# Patient Record
Sex: MALE | Race: WHITE | Employment: UNEMPLOYED | URBAN - METROPOLITAN AREA
[De-identification: names, ages, dates, MRNs, and addresses within clinical notes are randomized per-mention and may not be internally consistent; named-entity substitution may affect disease eponyms.]

---
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
## 2021-01-01 ENCOUNTER — HOSPITAL ENCOUNTER (EMERGENCY)
Age: 21
Discharge: HOME | End: 2021-01-01
Attending: EMERGENCY MEDICINE
Payer: COMMERCIAL

## 2021-01-01 VITALS
WEIGHT: 315 LBS | TEMPERATURE: 97.9 F | HEIGHT: 67 IN | RESPIRATION RATE: 20 BRPM | BODY MASS INDEX: 49.44 KG/M2 | DIASTOLIC BLOOD PRESSURE: 85 MMHG | SYSTOLIC BLOOD PRESSURE: 143 MMHG | HEART RATE: 106 BPM | OXYGEN SATURATION: 96 %

## 2021-01-01 DIAGNOSIS — J02.0 STREP PHARYNGITIS: Primary | ICD-10-CM

## 2021-01-01 LAB — S PYO AG THROAT QL: POSITIVE

## 2021-01-01 PROCEDURE — 87880 STREP A ASSAY W/OPTIC: CPT

## 2021-01-01 PROCEDURE — 6370000000 HC RX 637 (ALT 250 FOR IP): Performed by: EMERGENCY MEDICINE

## 2021-01-01 PROCEDURE — 99283 EMERGENCY DEPT VISIT LOW MDM: CPT

## 2021-01-01 PROCEDURE — 96374 THER/PROPH/DIAG INJ IV PUSH: CPT

## 2021-01-01 PROCEDURE — 6360000002 HC RX W HCPCS: Performed by: EMERGENCY MEDICINE

## 2021-01-01 RX ORDER — AMOXICILLIN 500 MG/1
500 CAPSULE ORAL 2 TIMES DAILY
Qty: 20 CAPSULE | Refills: 0 | Status: SHIPPED | OUTPATIENT
Start: 2021-01-01 | End: 2021-01-11

## 2021-01-01 RX ORDER — DEXAMETHASONE SODIUM PHOSPHATE 10 MG/ML
8 INJECTION, SOLUTION INTRAMUSCULAR; INTRAVENOUS ONCE
Status: COMPLETED | OUTPATIENT
Start: 2021-01-01 | End: 2021-01-01

## 2021-01-01 RX ORDER — AMOXICILLIN 500 MG/1
500 CAPSULE ORAL ONCE
Status: COMPLETED | OUTPATIENT
Start: 2021-01-01 | End: 2021-01-01

## 2021-01-01 RX ADMIN — AMOXICILLIN 500 MG: 500 CAPSULE ORAL at 17:02

## 2021-01-01 RX ADMIN — DEXAMETHASONE SODIUM PHOSPHATE 8 MG: 10 INJECTION, SOLUTION INTRAMUSCULAR; INTRAVENOUS at 17:00

## 2021-01-01 ASSESSMENT — ENCOUNTER SYMPTOMS
CONSTIPATION: 0
SHORTNESS OF BREATH: 0
TROUBLE SWALLOWING: 1
DIARRHEA: 0
SORE THROAT: 1
NAUSEA: 0
COUGH: 0
ABDOMINAL PAIN: 0
BACK PAIN: 0
VOMITING: 0

## 2021-01-01 ASSESSMENT — PAIN DESCRIPTION - PAIN TYPE: TYPE: ACUTE PAIN

## 2021-01-01 NOTE — ED PROVIDER NOTES
[address] [facility] Name: Joaquin Mortimer  MRN: [id number]  [place] 2000  Date of evaluation: 1/1/2021  Provider: Jarrod Payne MD    [address] [place] [address]       Chief Complaint   Patient presents with    Pharyngitis     reports sore throat x 2 weeks. no tylenol ormotrin pta. denies any cough, runny nose or fever or chills. pain worse on right side of throat. resp easy and unlabored. reports came to ed because is unable to talk today. HISTORY OF PRESENT ILLNESS   (Location/Symptom, Timing/Onset, Context/Setting, Quality, Duration, Modifying Factors, Severity)  Note limiting factors. Joaquin Mortimer is a 24 y.o. male who presents to the emergency department     Patient is a 55-year-old male with a past medical history of ADHD who presents with sore throat and difficulty swallowing. Patient reports symptoms started 2 weeks ago. He states that he thought that the symptoms would just go away but when they persisted he decided to come in for evaluation. He reports pain with swallowing food and even liquids but is able to do so. He denies any fevers or chills. Does report fatigue. States that he feels as though his voice is muffled and sounds different. Denies any difficulty breathing or chest pain. Denies any nausea, vomiting, diarrhea. Patient has had strep throat in the past.    The history is provided by the patient. Nursing Notes were reviewed. REVIEW OF SYSTEMS    (2-9 systems for level 4, 10 or more for level 5)     Review of Systems   Constitutional: Positive for fatigue. Negative for chills and fever. HENT: Positive for sore throat and trouble swallowing. Negative for congestion. Eyes: Negative for visual disturbance. Respiratory: Negative for cough and shortness of breath. Cardiovascular: Negative for chest pain. Gastrointestinal: Negative for abdominal pain, constipation, diarrhea, nausea and vomiting. Genitourinary: Negative for dysuria and hematuria. Musculoskeletal: Negative for back pain and neck pain. Skin: Negative for pallor and rash. Neurological: Negative for light-headedness and headaches. All other systems reviewed and are negative. Except as noted above the remainder of the review of systems was reviewed and negative. PAST MEDICAL HISTORY     Past Medical History:   Diagnosis Date    ADHD (attention deficit hyperactivity disorder)     MRSA (methicillin resistant staph aureus) culture positive 1/10/13    finger    Seasonal allergies          SURGICAL HISTORY     History reviewed. No pertinent surgical history. CURRENT MEDICATIONS       Previous Medications    No medications on file       ALLERGIES     Patient has no known allergies. FAMILY HISTORY     History reviewed. No pertinent family history.        SOCIAL HISTORY       Social History     Socioeconomic History    Marital status: Single     Spouse name: None    Number of children: None    Years of education: None    Highest education level: None   Occupational History    None   Social Needs    Financial resource strain: None    Food insecurity     Worry: None     Inability: None    Transportation needs     Medical: None     Non-medical: None   Tobacco Use    Smoking status: Never Smoker    Smokeless tobacco: Current User     Types: Snuff   Substance and Sexual Activity    Alcohol use: No    Drug use: No    Sexual activity: Never   Lifestyle    Physical activity     Days per week: None     Minutes per session: None    Stress: None   Relationships    Social connections     Talks on phone: None     Gets together: None     Attends Yarsanism service: None     Active member of club or organization: None     Attends meetings of clubs or organizations: None     Relationship status: None    Intimate partner violence     Fear of current or ex partner: None     Emotionally abused: None     Physically abused: None Forced sexual activity: None   Other Topics Concern    None   Social History Narrative    None       SCREENINGS               PHYSICAL EXAM    (up to 7 for level 4, 8 or more for level 5)     ED Triage Vitals   BP Temp Temp src Pulse Resp SpO2 Height Weight   -- -- -- -- -- -- -- --       Physical Exam  Vitals signs and nursing note reviewed. Constitutional:       General: He is not in acute distress. Appearance: Normal appearance. HENT:      Head: Normocephalic and atraumatic. Nose: Nose normal. No congestion. Mouth/Throat:      Mouth: Mucous membranes are moist.      Pharynx: Uvula midline. Pharyngeal swelling and posterior oropharyngeal erythema present. Tonsils: Tonsillar exudate present. No tonsillar abscesses. Comments: Patient's tonsils are significantly swollen with right slightly greater than left. Uvula is still midline. Does not appear to be any evidence of a peritonsillar abscess. Patient is able to open mouth and I am able to clearly see the posterior oropharynx. Eyes:      Conjunctiva/sclera: Conjunctivae normal.   Neck:      Musculoskeletal: Normal range of motion and neck supple. Cardiovascular:      Rate and Rhythm: Normal rate and regular rhythm. Pulses: Normal pulses. Heart sounds: Normal heart sounds. No murmur. Pulmonary:      Effort: Pulmonary effort is normal. No respiratory distress. Breath sounds: Normal breath sounds. Abdominal:      General: There is no distension. Palpations: Abdomen is soft. Tenderness: There is no abdominal tenderness. Musculoskeletal: Normal range of motion. General: No swelling or deformity. Lymphadenopathy:      Cervical: Cervical adenopathy present. Skin:     General: Skin is warm and dry. Neurological:      General: No focal deficit present. Mental Status: He is alert and oriented to person, place, and time.          DIAGNOSTIC RESULTS     EKG: All EKG's are interpreted by the Emergency Department Physician who either signs or Co-signs this chart in the absence of a cardiologist.        RADIOLOGY:     Interpretation per the Radiologist below, if available at the time of this note:    No orders to display         LABS:  Labs Reviewed   Tristan - Abnormal; Notable for the following components:       Result Value    Rapid Strep A Screen POSITIVE (*)     All other components within normal limits    Narrative:     Performed at:  [facility]  [address] [facility],  [place] [address] [place], [address] [place] [address]   Phone [phone]       All other labs were within normal range or not returned as of this dictation. EMERGENCY DEPARTMENT COURSE and DIFFERENTIAL DIAGNOSIS/MDM:   Vitals:    Vitals:    01/01/21 1525   BP: (!) 169/94   Pulse: 89   Resp: 24   Temp: 97.9 °F (36.6 °C)   SpO2: 98%   Weight: (!) 380 lb (172.4 kg)   Height: 5' 7\" (1.702 m)       Patient evaluated and previous record reviewed. Patient presents with sore throat for the last 2 weeks. Vital signs notable for high blood pressure. Physical exam as documented above. Rapid strep is positive. Patient does not appear to have a peritonsillar abscess but rather just very swollen tonsils. Will treat patient with a dose of IM Decadron here in the emergency department and give him first dose of antibiotic. Will discharge patient home on course of amoxicillin. I did  him on at home care instructions as well as return precautions and he voices understanding. CONSULTS:  None    PROCEDURES:  Unless otherwise noted below, none     Procedures      FINAL IMPRESSION      1.  Strep pharyngitis          DISPOSITION/PLAN   DISPOSITION Decision To Discharge 01/01/2021 04:14:04 PM      PATIENT REFERRED TO:  Kika Sidhu    Schedule an appointment as soon as possible for a visit         DISCHARGE MEDICATIONS:  New Prescriptions    AMOXICILLIN (AMOXIL) 500 MG CAPSULE    Take 1 capsule by

## 2021-05-20 ENCOUNTER — APPOINTMENT (OUTPATIENT)
Dept: GENERAL RADIOLOGY | Age: 21
End: 2021-05-20
Payer: COMMERCIAL

## 2021-05-20 ENCOUNTER — HOSPITAL ENCOUNTER (EMERGENCY)
Age: 21
Discharge: LEFT BEFORE DISCHARGE | End: 2021-05-20
Payer: COMMERCIAL

## 2021-05-20 VITALS
HEIGHT: 71 IN | OXYGEN SATURATION: 95 % | WEIGHT: 315 LBS | DIASTOLIC BLOOD PRESSURE: 92 MMHG | SYSTOLIC BLOOD PRESSURE: 181 MMHG | HEART RATE: 105 BPM | TEMPERATURE: 97.2 F | BODY MASS INDEX: 44.1 KG/M2 | RESPIRATION RATE: 16 BRPM

## 2021-05-20 DIAGNOSIS — J30.2 SEASONAL ALLERGIES: ICD-10-CM

## 2021-05-20 DIAGNOSIS — R05.9 COUGH: Primary | ICD-10-CM

## 2021-05-20 PROCEDURE — 99281 EMR DPT VST MAYX REQ PHY/QHP: CPT

## 2021-05-20 PROCEDURE — 99282 EMERGENCY DEPT VISIT SF MDM: CPT

## 2021-05-20 PROCEDURE — 71046 X-RAY EXAM CHEST 2 VIEWS: CPT

## 2021-05-20 RX ORDER — FLUTICASONE PROPIONATE 50 MCG
1 SPRAY, SUSPENSION (ML) NASAL DAILY
Status: DISCONTINUED | OUTPATIENT
Start: 2021-05-20 | End: 2021-05-20 | Stop reason: HOSPADM

## 2021-05-20 RX ORDER — CETIRIZINE HYDROCHLORIDE, PSEUDOEPHEDRINE HYDROCHLORIDE 5; 120 MG/1; MG/1
1 TABLET, FILM COATED, EXTENDED RELEASE ORAL 2 TIMES DAILY
Status: DISCONTINUED | OUTPATIENT
Start: 2021-05-20 | End: 2021-05-20 | Stop reason: HOSPADM

## 2021-05-20 NOTE — ED PROVIDER NOTES
[address]  EMERGENCY DEPARTMENT ENCOUNTER        Pt Name: Laci Stein  MRN: [id number]  [place] 2000  Date of evaluation: 5/20/2021  Provider: JOSEF Villagomez  PCP: No primary care provider on file. This patient was not seen and evaluated by the attending physician No att. providers found. I have evaluated this patient. My supervising physician was available for consultation. CHIEF COMPLAINT       Chief Complaint   Patient presents with    Cough     pt states congestion cough and sore throat for a few days, pt states he gets like this every summer but wants checked. HISTORY OF PRESENT ILLNESS   (Location/Symptom, Timing/Onset, Context/Setting, Quality, Duration, Modifying Factors, Severity)  Note limiting factors. Laci Stein is a 24 y.o. male who presents via private vehicle from his home for cough. Pt notes he has had runny nose, itchy watery eyes, mild nonproductive cough and nasal congestion for the past several days, not getting better but not getting worse. He notes he typically feels seasonal allergies and this feels siumilar. He has not taken any medicine for his symptoms. He presents today wanting to be sure nothing else was going on. He denies chest pain, SOB, lightheadedness or faintness. He denies nausea, abdominal pain, sore throat, fevers or headaches. He denies recent sick contacts. He denies pmhx covid infection or vaccination. Nursing Notes were all reviewed and agreed with or any disagreements were addressed  in the HPI. Pt was seen during the [place] 19 pandemic. Appropriate PPE worn by ME during patient encounters. Pt seen during a time with constrained hospital bed capacity and other potential inpatient and outpatient resources were constrained due to the viral pandemic. REVIEW OF SYSTEMS    (2-9 systems for level 4, 10 or more for level 5)     Review of Systems    Positives and Pertinent negatives as per HPI.   Except as noted abovein the ROS, all other systems were reviewed and negative. PAST MEDICAL HISTORY     Past Medical History:   Diagnosis Date    ADHD (attention deficit hyperactivity disorder)     MRSA (methicillin resistant staph aureus) culture positive 1/10/13    finger    Seasonal allergies          SURGICAL HISTORY   History reviewed. No pertinent surgical history. CURRENTMEDICATIONS     There are no discharge medications for this patient. ALLERGIES     Patient has no known allergies. FAMILYHISTORY     History reviewed. No pertinent family history. SOCIAL HISTORY       Social History     Socioeconomic History    Marital status: Single     Spouse name: None    Number of children: None    Years of education: None    Highest education level: None   Occupational History    None   Tobacco Use    Smoking status: Never Smoker    Smokeless tobacco: Current User     Types: Snuff   Substance and Sexual Activity    Alcohol use: Yes    Drug use: Yes     Types: Marijuana    Sexual activity: Never   Other Topics Concern    None   Social History Narrative    None     Social Determinants of Health     Financial Resource Strain:     Difficulty of Paying Living Expenses:    Food Insecurity:     Worried About Running Out of Food in the Last Year:     [address] in the Last Year:    Transportation Needs:     Lack of Transportation (Medical):      Lack of Transportation (Non-Medical):    Physical Activity:     Days of Exercise per Week:     Minutes of Exercise per Session:    Stress:     Feeling of Stress :    Social Connections:     Frequency of Communication with Friends and Family:     Frequency of Social Gatherings with Friends and Family:     Attends L[employer] day Services:     Active Member of Clubs or Organizations:     Attends Club or Organization Meetings:     Marital Status:    Intimate Partner Violence:     Fear of Current or Ex-Partner:     Emotionally Abused:     Physically Abused:     Sexually Abused:        SCREENINGS             PHYSICAL EXAM    (up to 7 for level 4, 8 or more for level 5)     ED Triage Vitals [05/20/21 1526]   BP Temp Temp Source Heart Rate Resp SpO2 Height Weight   (!) 181/92 97.2 °F (36.2 °C) Oral (!) 105 16 95 % 5' 11\" (1.803 m) 345 lb (156.5 kg)       Physical Exam  Vitals and nursing note reviewed. Constitutional:       General: He is awake. He is not in acute distress. Appearance: Normal appearance. He is well-developed. He is not ill-appearing, toxic-appearing or diaphoretic. HENT:      Head: Normocephalic and atraumatic. No right periorbital erythema or left periorbital erythema. Jaw: There is normal jaw occlusion. No trismus. Right Ear: Hearing, ear canal and external ear normal. Tympanic membrane is not injected. Left Ear: Hearing, ear canal and external ear normal. Tympanic membrane is not injected. Nose: Congestion and rhinorrhea present. Rhinorrhea is clear. Right Nostril: No septal hematoma. Left Nostril: No septal hematoma. Right Sinus: No maxillary sinus tenderness or frontal sinus tenderness. Left Sinus: No maxillary sinus tenderness or frontal sinus tenderness. Mouth/Throat:      Lips: Pink. Mouth: Mucous membranes are moist. No oral lesions or angioedema. Tongue: No lesions. Palate: No mass and lesions. Pharynx: Oropharynx is clear. Uvula midline. Tonsils: No tonsillar exudate or tonsillar abscesses. Eyes:      General:         Right eye: No discharge. Left eye: No discharge. Extraocular Movements: Extraocular movements intact. Conjunctiva/sclera: Conjunctivae normal.      Pupils: Pupils are equal, round, and reactive to light. Cardiovascular:      Rate and Rhythm: Normal rate and regular rhythm. Pulses: Normal pulses. Heart sounds: Normal heart sounds. No murmur heard. No friction rub. No gallop.     Pulmonary:      Effort: PROVIDED HISTORY: cough TECHNOLOGIST PROVIDED HISTORY: Reason for exam:->cough Reason for Exam: Cough (pt states congestion cough and sore throat for a few days, pt states he gets like this every summer but wants checked. FINDINGS: HEART/MEDIASTINUM: The cardiomediastinal silhouette is within normal limits. PLEURA/LUNGS: There are no focal consolidations or pleural effusions. There is no appreciable pneumothorax. BONES/SOFT TISSUE: No acute abnormality. No radiographic evidence of acute pulmonary disease. PROCEDURES   Unless otherwise noted below, none     Procedures    CRITICAL CARE TIME   N/A    CONSULTS:  None      EMERGENCY DEPARTMENT COURSE and DIFFERENTIALDIAGNOSIS/MDM:   Vitals:    Vitals:    05/20/21 1526   BP: (!) 181/92   Pulse: (!) 105   Resp: 16   Temp: 97.2 °F (36.2 °C)   TempSrc: Oral   SpO2: 95%   Weight: 345 lb (156.5 kg)   Height: 5' 11\" (1.803 m)       Patient was given thefollowing medications:  Medications - No data to display    PDMP Monitoring:    Last PDMP [place] as Reviewed [facility]):  Review User Review Instant Review Result            Urine Drug Screenings (1 yr)    No resulted procedures found. Medication Contract and Consent for Opioid Use Documents Filed      No documents found                MDM:   Patient seen and evaluated. Old records reviewed. Diagnostic testing reviewed and results discussed. I have independently evaluated this patient based upon my scope of practice. Supervising physician was in the department for consultation as needed. Pt is a pleasant 23 yo male who presents for cough. On exam he is alert, oriented, afebrile, well perfused and HD stable throughout his ER stay. He is mildly tachycardic in triage however this improved without treatment by the time he is roomed. I retook VS and his HR was <90, spO2 99%. He has mild congestion and clear rhinorrhea but breath sounds are CTABL. CXR is clear.  Pt will be tested for covid and flu however I anticipate symptomatic tx for seasonal allergies. Pt was noted to have eloped from the ER prior to being covid tested. I was not made aware prior to this occurrence that patient was wanting to leave prior to all testing/completion of visit. Discharge Time out:  CC Reviewed Yes   Test Results Yes     Vitals:    05/20/21 1526   BP: (!) 181/92   Pulse: (!) 105   Resp: 16   Temp: 97.2 °F (36.2 °C)   SpO2: 95%              FINAL IMPRESSION      1. Cough    2. Seasonal allergies          DISPOSITION/PLAN   DISPOSITION Eloped - Left Before Treatment Complete 05/20/2021 06:54:23 PM      PATIENT REFERREDTO:  No follow-up provider specified. DISCHARGE MEDICATIONS:  There are no discharge medications for this patient. DISCONTINUED MEDICATIONS:  There are no discharge medications for this patient.              (Please note that portions ofthis note were completed with a voice recognition program.  Efforts were made to edit the dictations but occasionally words are mis-transcribed.)    Tania Sherman (electronically signed)        Tania Sherman  05/23/21 8298

## 2022-12-01 ENCOUNTER — HOSPITAL ENCOUNTER (EMERGENCY)
Age: 22
Discharge: HOME | End: 2022-12-01
Attending: EMERGENCY MEDICINE
Payer: COMMERCIAL

## 2022-12-01 VITALS
SYSTOLIC BLOOD PRESSURE: 140 MMHG | HEIGHT: 72 IN | RESPIRATION RATE: 18 BRPM | BODY MASS INDEX: 42.66 KG/M2 | DIASTOLIC BLOOD PRESSURE: 76 MMHG | HEART RATE: 80 BPM | WEIGHT: 315 LBS | TEMPERATURE: 97.6 F | OXYGEN SATURATION: 98 %

## 2022-12-01 DIAGNOSIS — L02.419 CELLULITIS AND ABSCESS OF LEG: Primary | ICD-10-CM

## 2022-12-01 DIAGNOSIS — L03.119 CELLULITIS AND ABSCESS OF LEG: Primary | ICD-10-CM

## 2022-12-01 PROCEDURE — 99282 EMERGENCY DEPT VISIT SF MDM: CPT

## 2022-12-01 RX ORDER — CLINDAMYCIN HYDROCHLORIDE 300 MG/1
600 CAPSULE ORAL 2 TIMES DAILY
COMMUNITY

## 2022-12-01 RX ORDER — SULFAMETHOXAZOLE AND TRIMETHOPRIM 800; 160 MG/1; MG/1
1 TABLET ORAL 2 TIMES DAILY
COMMUNITY
Start: 2022-11-27

## 2022-12-01 ASSESSMENT — PAIN DESCRIPTION - DESCRIPTORS: DESCRIPTORS: DISCOMFORT

## 2022-12-01 ASSESSMENT — PAIN - FUNCTIONAL ASSESSMENT
PAIN_FUNCTIONAL_ASSESSMENT: 0-10
PAIN_FUNCTIONAL_ASSESSMENT: PREVENTS OR INTERFERES SOME ACTIVE ACTIVITIES AND ADLS

## 2022-12-01 ASSESSMENT — PAIN DESCRIPTION - ONSET: ONSET: ON-GOING

## 2022-12-01 ASSESSMENT — PAIN DESCRIPTION - ORIENTATION: ORIENTATION: LEFT

## 2022-12-01 ASSESSMENT — PAIN SCALES - GENERAL: PAINLEVEL_OUTOF10: 7

## 2022-12-01 ASSESSMENT — PAIN DESCRIPTION - LOCATION: LOCATION: KNEE

## 2022-12-01 ASSESSMENT — PAIN DESCRIPTION - FREQUENCY: FREQUENCY: CONTINUOUS

## 2022-12-01 NOTE — ED PROVIDER NOTES
[address] ED  EMERGENCY DEPARTMENT ENCOUNTER        Pt Name: Gen Ferrari  MRN: [id number]  [place] 2000  Date of evaluation: 12/1/2022  Provider: Gen Conn PA-C  PCP: No primary care provider on file. Shared Visit or Autonomous Visit:  I have seen and evaluated this patient with my supervising physician Anitha Casanova MD.    [address] [facility]       Chief Complaint   Patient presents with    Abscess     Left knee, started 3-4 days ago. Painful, red, swollen       HISTORY OF PRESENT ILLNESS   (Location/Symptom, Timing/Onset, Context/Setting, Quality, Duration, Modifying Factors, Severity)  Note limiting factors. Gen Ferrari is a 25 y.o. male presenting from the group home for evaluation of abscess to his left knee states has been present for about 3 to 4 days has been taking antibiotics Bactrim and clindamycin states the redness is going down but they wanted him to come in and get checked out. He denies any difficulty moving his knee. No fever no vomiting. Not diabetic. The history is provided by the patient. Abscess  Location:  Leg  Leg abscess location:  L knee  Abscess quality: induration, painful and redness    Abscess quality: not draining    Red streaking: no    Duration:  4 days  Progression:  Improving  Chronicity:  New  Context: not diabetes and not immunosuppression    Associated symptoms: no fever and no vomiting    Risk factors: hx of MRSA      Nursing Notes were reviewed    REVIEW OF SYSTEMS    (2-9 systems for level 4, 10 or more for level 5)     Review of Systems   Constitutional:  Negative for chills and fever. Respiratory:  Negative for shortness of breath. Cardiovascular:  Negative for chest pain. Gastrointestinal:  Negative for vomiting. Skin:  Positive for color change (erythema). Abscess left leg     Positives and Pertinent negatives as per HPI.        PAST MEDICAL HISTORY     Past Medical History:   Diagnosis Date    ADHD (attention deficit hyperactivity disorder)     MRSA (methicillin resistant staph aureus) culture positive 1/10/13    finger    Seasonal allergies          SURGICAL HISTORY   No past surgical history on file. [address]       Discharge Medication List as of 12/1/2022  6:06 PM        CONTINUE these medications which have NOT CHANGED    Details   sulfamethoxazole-trimethoprim (BACTRIM DS;SEPTRA DS) 800-160 MG per tablet Take 1 tablet by mouth 2 times dailyHistorical Med      clindamycin (CLEOCIN) 300 MG capsule Take 600 mg by mouth in the morning and at bedtimeHistorical Med               ALLERGIES     Amoxicillin, Aspirin, and Penicillins    FAMILYHISTORY     No family history on file. SOCIAL HISTORY       Social History     Socioeconomic History    Marital status: Single   Tobacco Use    Smoking status: Never    Smokeless tobacco: Current     Types: Snuff   Substance and Sexual Activity    Alcohol use: Yes    Drug use: Yes     Types: Marijuana (Weed)    Sexual activity: Never       SCREENINGS    [place] Coma Scale  Eye Opening: Spontaneous  Best Verbal Response: Oriented  Best Motor Response: Obeys commands  [place] Coma Scale Score: 15        PHYSICAL EXAM    (up to 7 for level 4, 8 or more for level 5)     ED Triage Vitals [12/01/22 1610]   BP Temp Temp src Heart Rate Resp SpO2 Height Weight   (!) 146/77 97.6 °F (36.4 °C) -- 86 18 97 % 6' (1.829 m) (!) 330 lb (149.7 kg)       Physical Exam  Vitals and nursing note reviewed. Constitutional:       Appearance: He is well-developed. He is not ill-appearing or toxic-appearing. HENT:      Head: Normocephalic and atraumatic. Mouth/Throat:      Mouth: Mucous membranes are moist.      Pharynx: Oropharynx is clear. Cardiovascular:      Rate and Rhythm: Normal rate and regular rhythm. Pulses: Normal pulses. Popliteal pulses are 2+ on the left side. Posterior tibial pulses are 2+ on the left side.    Pulmonary:      Effort: Pulmonary effort is normal. No respiratory distress. Breath sounds: Normal breath sounds. No wheezing or rales. Musculoskeletal:      Left knee: Erythema present. No effusion. Tenderness present. Comments: Approximately 5 cm area of erythema and induration and mild swelling to suprapatellar left knee. 2 tiny pustules at center. No active drainage. No crepitus. There is a line drawn around the redness and redness has receded from the line. No lymphangitis. No joint effusion. Full flexion and extension of the knee. Intact sensation distal pulses 2+. Skin:     General: Skin is warm and dry. Neurological:      Mental Status: He is alert and oriented to person, place, and time. GCS: GCS eye subscore is 4. GCS verbal subscore is 5. GCS motor subscore is 6. Sensory: Sensation is intact. Motor: Motor function is intact. No abnormal muscle tone. Psychiatric:         Behavior: Behavior normal.       DIAGNOSTIC RESULTS   LABS:    Labs Reviewed - No data to display    No results found for this visit on 12/01/22. All other labs were not returned as of this dictation. EKG: All EKG's are interpreted by the Emergency Department Physician in the absence of a cardiologist.  Please see their note for interpretation of EKG. RADIOLOGY:   Non-plain film images such as CT, Ultrasound and MRI are read by the radiologist. Plain radiographic images are visualized andpreliminarily interpreted by the  ED Provider with the below findings:        Interpretation [facility] Radiologist below, if available at the time of this note:    No orders to display     No results found. PROCEDURES   Unless otherwise noted below, none     Procedures    CRITICAL CARE TIME   Critical care provided for this patient of which 0 min were spend on critical care and decision making. 0 min spent on procedures.  There was imminent failure of an organ system which required critical intervention to prevent clinically significant progression of life threatening deterioration of the patient's condition to the point of disability or death. CONSULTS:  None      EMERGENCY DEPARTMENT COURSE and DIFFERENTIAL DIAGNOSIS/MDM:   Vitals:    Vitals:    12/01/22 1610 12/01/22 1807   BP: (!) 146/77 (!) 140/76   Pulse: 86 80   Resp: 18 18   Temp: 97.6 °F (36.4 °C)    SpO2: 97% 98%   Weight: (!) 330 lb (149.7 kg)    Height: 6' (1.829 m)        Patient was given thefollowing medications:  Medications - No data to display    Is this patient to be included in the SEP-1 Core Measure due to severe sepsis or septic shock? No   Exclusion criteria - the patient is NOT to be included for SEP-1 Core Measure due to:  2+ SIRS criteria are not met       ED course  Patient presented to the ER for evaluation of infection at his left leg. On exam he has about a 5 x 5 cm area of erythema induration and tenderness to the left suprapatellar knee. Does not involve the knee joint. No joint effusion do not suspect septic joint. Full flexion extension of the leg. There are 2 tiny pustules at center with mild fluctuance at the center and recommend I&D however he is refusing. States he is already improving on the antibiotics and he does not want I&D. He is currently taking clindamycin and Bactrim. Redness has receded from the line that was drawn around the area. He will be discharged back to retirement advised warm compresses to continue current antibiotics and to follow-up with physician at the retirement and advise returning to the ER for any worsening symptoms. He understands. I estimate there is LOW risk for SEVERE CELLULITIS, SEPSIS OR NECROTIZING FASCIITIS,  thus I consider the discharge disposition reasonable. FINAL IMPRESSION      1.  Cellulitis and abscess of leg          DISPOSITION/PLAN   DISPOSITION Decision to Discharge    PATIENT REFERREDTO:  Physician at the retirement    In 1 day      [facility] ED  [facility] Yesenia Rees  [phone]    If symptoms worsen      DISCHARGE MEDICATIONS:  Discharge Medication List as of 12/1/2022  6:06 PM          DISCONTINUED MEDICATIONS:  Discharge Medication List as of 12/1/2022  6:06 PM                 (Please note that portions ofthis note were completed with a voice recognition program.  Efforts were made to edit the dictations but occasionally words are mis-transcribed.)    Clifford Patterson PA-C (electronically signed)           Vivienne Perez PA-C  12/02/22 0018

## 2022-12-01 NOTE — DISCHARGE INSTRUCTIONS
We recommended incision and drainage but you are declining to have this procedure done. We advise you to continue current antibiotics of Bactrim and clindamycin and close follow-up. Warm compresses. Return for any worsening specifically for any difficulty moving your knee, spreading redness, increased pain or swelling or if you develop any fever or vomiting.

## 2022-12-02 ASSESSMENT — ENCOUNTER SYMPTOMS
SHORTNESS OF BREATH: 0
VOMITING: 0
COLOR CHANGE: 1

## 2022-12-04 NOTE — ED PROVIDER NOTES
I independently examined and evaluated Harry Mariscal. All diagnostic, treatment, and disposition decisions were made by myself in conjunction with the YANNICK, Klaudia Shoulders. For all further details of the patient's emergency department visit, please see their documentation. 71-year-old male presents in [facility] custody for evaluation of an infection on his right knee. He states he developed a red bump there several days ago that was getting worse rapidly. He was started on antibiotics while in [facility]. They outlined the area and he states it is getting better and the area of redness is shrinking. He is not having fevers. He states they still wanted him to come in for evaluation today. Vitals:    12/01/22 1807   BP: (!) 140/76   Pulse: 80   Resp: 18   Temp:    SpO2: 98%   There is a 4 cm area of erythema over the left prepatellar bursa with 2 small pustules noted. The area is indurated with some underlying blood. He has full range of motion of the knee joint. The left leg is neurovascularly intact. No pain with range of motion of the knee. Heart rate is regular, no respiratory distress. No results found for this visit on 12/01/22. No orders to display           I personally saw and performed a substantive portion of the visit including all aspects of the medical decision making. MDM:    Patient is nontoxic-appearing. He does have some erythema over the prepatellar bursa of the left knee. There are 2 small pustules. There is no underlying fluctuance. He has full range of motion of the knee joint I do not think this infection is in the joint and I do not suspect a septic joint but I do think it is a septic bursitis. He has been on antibiotics and the erythema has been improving which she can see based on the skin markings. I still would recommend a I&D of the bursa given the fluctuance and pustules. The patient is refusing.   He states under no circumstance will be poked him with any needles or scalpel. He states he wants to proceed with antibiotics only. He understands the risks that this may worsen could potentially even see the joint and cause a septic joint which would require surgery. He states he is willing to take that risk. I personally saw the patient and independently provided 0 minutes of nonconcurrent critical care out of the total shared critical care time provided.         Twin Arana MD  12/04/22 0376